# Patient Record
Sex: FEMALE | Race: ASIAN | NOT HISPANIC OR LATINO | Employment: UNEMPLOYED | URBAN - METROPOLITAN AREA
[De-identification: names, ages, dates, MRNs, and addresses within clinical notes are randomized per-mention and may not be internally consistent; named-entity substitution may affect disease eponyms.]

---

## 2017-01-30 ENCOUNTER — OFFICE VISIT - HEALTHEAST (OUTPATIENT)
Dept: UROLOGY | Facility: CLINIC | Age: 67
End: 2017-01-30

## 2017-01-30 DIAGNOSIS — N20.9 URINARY CALCULUS, UNSPECIFIED: ICD-10-CM

## 2017-01-30 DIAGNOSIS — N20.0 CALCULUS OF KIDNEY: ICD-10-CM

## 2021-06-02 ENCOUNTER — RECORDS - HEALTHEAST (OUTPATIENT)
Dept: ADMINISTRATIVE | Facility: CLINIC | Age: 71
End: 2021-06-02

## 2021-06-08 NOTE — PROGRESS NOTES
Assessment/Plan:        Diagnoses and all orders for this visit:    Calculus of kidney  -     Stone Formation, 24 Hour Urine x2; Standing  -     Renal Function Profile; Future; Expected date: 2/13/17  -     Uric Acid; Future; Expected date: 2/13/17  -     Magnesium; Future; Expected date: 2/13/17  -     24 Hour Urine Collection Steps Education    Urinary calculus, unspecified  -     POCT urinalysis dipstick-[facility]      Stone Management Plan  [facility] 11/17/2016 1/30/2017   Urinary Tract Infection Possible Infection No suspicion of infection   Renal Colic Asymptomatic at this time Asymptomatic at this time   Renal Failure No suspicion of renal failure No suspicion of renal failure   Current CT date 11/16/2016 -   Right sided stones? No -   R Stone Event No current event No current event   Left sided stones? Yes -   L Number of ureteral stones 1 -   L GSD of ureteral stones 3 -   L Location of ureteral stone Proximal -   L Number of kidney stones  No renal stones -   L GSD of kidney stones N/A -   L Hydronephrosis Moderate -   L Stone Event New event Resolved event   Diagnosis date 11/16/2016 -   Initial location of primary symptomatic stone Proximal -   Initial GSD of primary symptomatic stone 3 -   Resolved date - 1/30/2017   Post-op status - No imaging   L Current Plan Drain -   Drain rationale Infection -             Subjective:      HPI  Ms. Leonel Lerma is a 66 y.o. [place] female returning to the [facility] [place] for late postoperative follow-up.     She returns status post staged feft ureteroscopic extraction for renal stone, initially presented with concern of sepsis. She has had no unanticipated events.     She is asymptomatic at present. She denies symptoms of fever, chills, flank pain, nausea, vomiting, urinary frequency and dysuria.    Imaging was not performed today because stone was extracted intact and patient is asymptomatic.    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 Yalee states she has not followed up with her PCP.    Stone composition was 20 % calcium oxalate and 80 % uric acid.     PLAN    First time uric acid/calcium based stone former with interval clearance of left renal calculus, initially presented with sepsis.     She is at risk for ongoing active stone disease and will initiate stone risk evaluation.     Addendum: Patient initially agreed to proceed with stone risk workup. However, prior to exit, she states she would like to hold off on risk workup until she visits with her PCP.    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 So Edgar.       ROS   Review of systems is negative except for HPI.    Past Medical History   Diagnosis Date     Benign Essential Hypertension      Helicobacter Pylori (H. Pylori) Infection      Hepatitis      Hyperlipidemia      Joint Pain, Localized In The Shoulder      Lower Back Pain      Obesity        Past Surgical History   Procedure Laterality Date     Cystoscopy w/ ureteral stent placement Left 11/17/2016     Procedure: CYSTOSCOPY STENT INSERTION;  Surgeon: Saeid Reyez MD;  Location: [facility];  Service:      Cystoscopy w/ ureteral stent placement Left 12/2/2016     Procedure: CYSTOSCOPY, LEFT URETEROSCOPY STENT REMOVAL STONE REMOVAL;  Surgeon: Saeid Reyez MD;  Location: [facility];  Service:        Current Outpatient Prescriptions   Medication Sig Dispense Refill     amlodipine-olmesartan (LEONELA) 5-40 mg per tablet Take 1 tablet by mouth as needed. Pt takes it prn.       ibuprofen (ADVIL,MOTRIN) 200 MG tablet Take 400 mg by mouth every 6 (six) hours as needed for pain.       tamsulosin (FLOMAX) 0.4 mg Cp24 Take 0.4 mg by mouth daily.       tolterodine (DETROL) 2 MG tablet Take 1 tablet (2 mg total) by mouth every 12 (twelve) hours. 28  tablet 1     No current facility-administered medications for this visit.        Allergies   Allergen Reactions     Dramamine [Dimenhydrinate] Other (See Comments)     Tremors-questionable ,was in ER and was given dramamine  and started shaking but has taken OTC dramamine last 2 weeks without problems, possibly skaking due to elevated WBC in ER       Social History     Social History     Marital status:      Spouse name: N/A     Number of children: N/A     Years of education: N/A     Occupational History     Not on file.     Social History Main Topics     Smoking status: Never Smoker     Smokeless tobacco: Never Used     Alcohol use No     Drug use: No     Sexual activity: Not on file     Other Topics Concern     Not on file     Social History Narrative       Family History   Problem Relation Age of Onset     Kidney disease Brother      Hypertension Brother      Objective:      Physical Exam  There were no vitals filed for this visit.  General - well developed, well nourished, appropriate for age. Appears no distress at this time  Abdomen - mildly obese soft, non-tender, no hepatosplenomegaly, no masses.   - no flank tenderness, no suprapubic tenderness, kidney and bladder non-palpable  MSK - normal spinal curvature. no spinal tenderness. normal gait. muscular strength intact.  Psych - oriented to time, place, and person, normal mood and affect.      Labs   Urinalysis POC (Office):  BLOOD UA   Date Value Ref Range Status   11/17/2016 Large Negative Final     NITRITE, UA   Date Value Ref Range Status   11/17/2016 Negative Negative Final   11/16/2016 Positive (!) Negative Final   11/05/2014 Negative Negative Final   06/25/2012 Negative (Negative) Final     LEUKOCYTES, UA    Date Value Ref Range Status   11/17/2016 Large (!) Negative Final     PH UA   Date Value Ref Range Status   11/17/2016 6.0 4.5 - 8.0 Final       Lab Urinalysis:  BLOOD, UA   Date Value Ref Range Status   11/16/2016 Small (!) Negative Final    11/05/2014 Trace (!) Negative Final   06/25/2012 Trace-lysed (!) (Negative) Final     NITRITE, UA   Date Value Ref Range Status   11/17/2016 Negative Negative Final   11/16/2016 Positive (!) Negative Final   11/05/2014 Negative Negative Final   06/25/2012 Negative (Negative) Final     LEUKOCYTES, UA   Date Value Ref Range Status   11/16/2016 Large (!) Negative Final   11/05/2014 Small (!) Negative Final   06/25/2012 Negative (Negative) Final     PH, UA   Date Value Ref Range Status   11/16/2016 5.0 4.5 - 8.0 Final   11/05/2014 6.0 4.5 - 8.0 Final   06/25/2012 5.5 (5.0-8.0) Final

## 2022-06-13 ENCOUNTER — APPOINTMENT (OUTPATIENT)
Dept: CT IMAGING | Facility: CLINIC | Age: 72
End: 2022-06-13
Attending: EMERGENCY MEDICINE
Payer: MEDICARE

## 2022-06-13 ENCOUNTER — HOSPITAL ENCOUNTER (EMERGENCY)
Facility: CLINIC | Age: 72
Discharge: HOME | End: 2022-06-14
Attending: EMERGENCY MEDICINE | Admitting: EMERGENCY MEDICINE
Payer: MEDICARE

## 2022-06-13 DIAGNOSIS — N23 RENAL COLIC: ICD-10-CM

## 2022-06-13 LAB
ALBUMIN SERPL-MCNC: 3.3 G/DL (ref 3.5–5)
ALBUMIN UR-MCNC: NEGATIVE MG/DL
ALP SERPL-CCNC: 58 U/L (ref 45–120)
ALT SERPL W P-5'-P-CCNC: 29 U/L (ref 0–45)
ANION GAP SERPL CALCULATED.3IONS-SCNC: 10 MMOL/L (ref 5–18)
APPEARANCE UR: CLEAR
AST SERPL W P-5'-P-CCNC: 22 U/L (ref 0–40)
BILIRUB SERPL-MCNC: 0.4 MG/DL (ref 0–1)
BILIRUB UR QL STRIP: NEGATIVE
BUN SERPL-MCNC: 38 MG/DL (ref 8–28)
CALCIUM SERPL-MCNC: 9.4 MG/DL (ref 8.5–10.5)
CHLORIDE BLD-SCNC: 110 MMOL/L (ref 98–107)
CO2 SERPL-SCNC: 20 MMOL/L (ref 22–31)
COLOR UR AUTO: ABNORMAL
CREAT SERPL-MCNC: 1.63 MG/DL (ref 0.6–1.1)
ERYTHROCYTE [DISTWIDTH] IN BLOOD BY AUTOMATED COUNT: 13.4 % (ref 10–15)
GFR SERPL CREATININE-BSD FRML MDRD: 33 ML/MIN/1.73M2
GLUCOSE BLD-MCNC: 123 MG/DL (ref 70–125)
GLUCOSE UR STRIP-MCNC: NEGATIVE MG/DL
HCT VFR BLD AUTO: 37 % (ref 35–47)
HGB BLD-MCNC: 11.8 G/DL (ref 11.7–15.7)
HGB UR QL STRIP: ABNORMAL
KETONES UR STRIP-MCNC: NEGATIVE MG/DL
LEUKOCYTE ESTERASE UR QL STRIP: NEGATIVE
LIPASE SERPL-CCNC: 59 U/L (ref 0–52)
MCH RBC QN AUTO: 33.3 PG (ref 26.5–33)
MCHC RBC AUTO-ENTMCNC: 31.9 G/DL (ref 31.5–36.5)
MCV RBC AUTO: 105 FL (ref 78–100)
MUCOUS THREADS #/AREA URNS LPF: PRESENT /LPF
NITRATE UR QL: NEGATIVE
PH UR STRIP: 5.5 [PH] (ref 5–7)
PLATELET # BLD AUTO: 201 10E3/UL (ref 150–450)
POTASSIUM BLD-SCNC: 4.8 MMOL/L (ref 3.5–5)
PROT SERPL-MCNC: 7.4 G/DL (ref 6–8)
RBC # BLD AUTO: 3.54 10E6/UL (ref 3.8–5.2)
RBC URINE: 4 /HPF
SODIUM SERPL-SCNC: 140 MMOL/L (ref 136–145)
SP GR UR STRIP: 1.02 (ref 1–1.03)
SQUAMOUS EPITHELIAL: 2 /HPF
UROBILINOGEN UR STRIP-MCNC: <2 MG/DL
WBC # BLD AUTO: 9.9 10E3/UL (ref 4–11)
WBC URINE: 2 /HPF

## 2022-06-13 PROCEDURE — 83690 ASSAY OF LIPASE: CPT | Performed by: EMERGENCY MEDICINE

## 2022-06-13 PROCEDURE — 250N000011 HC RX IP 250 OP 636: Performed by: EMERGENCY MEDICINE

## 2022-06-13 PROCEDURE — 96374 THER/PROPH/DIAG INJ IV PUSH: CPT

## 2022-06-13 PROCEDURE — 99285 EMERGENCY DEPT VISIT HI MDM: CPT | Mod: 25

## 2022-06-13 PROCEDURE — 74176 CT ABD & PELVIS W/O CONTRAST: CPT

## 2022-06-13 PROCEDURE — 36415 COLL VENOUS BLD VENIPUNCTURE: CPT | Performed by: EMERGENCY MEDICINE

## 2022-06-13 PROCEDURE — 80053 COMPREHEN METABOLIC PANEL: CPT | Performed by: EMERGENCY MEDICINE

## 2022-06-13 PROCEDURE — 81001 URINALYSIS AUTO W/SCOPE: CPT | Performed by: EMERGENCY MEDICINE

## 2022-06-13 PROCEDURE — 96375 TX/PRO/DX INJ NEW DRUG ADDON: CPT

## 2022-06-13 PROCEDURE — 85027 COMPLETE CBC AUTOMATED: CPT | Performed by: EMERGENCY MEDICINE

## 2022-06-13 RX ORDER — KETOROLAC TROMETHAMINE 15 MG/ML
15 INJECTION, SOLUTION INTRAMUSCULAR; INTRAVENOUS ONCE
Status: COMPLETED | OUTPATIENT
Start: 2022-06-13 | End: 2022-06-13

## 2022-06-13 RX ADMIN — KETOROLAC TROMETHAMINE 15 MG: 15 INJECTION, SOLUTION INTRAMUSCULAR; INTRAVENOUS at 22:24

## 2022-06-13 RX ADMIN — FAMOTIDINE 20 MG: 10 INJECTION INTRAVENOUS at 22:20

## 2022-06-14 VITALS
TEMPERATURE: 97.1 F | DIASTOLIC BLOOD PRESSURE: 63 MMHG | RESPIRATION RATE: 18 BRPM | BODY MASS INDEX: 44.33 KG/M2 | SYSTOLIC BLOOD PRESSURE: 148 MMHG | HEART RATE: 61 BPM | OXYGEN SATURATION: 99 % | WEIGHT: 225.8 LBS | HEIGHT: 60 IN

## 2022-06-14 PROBLEM — N23 RENAL COLIC: Status: ACTIVE | Noted: 2022-06-14

## 2022-06-14 RX ORDER — ONDANSETRON 4 MG/1
4 TABLET, ORALLY DISINTEGRATING ORAL EVERY 8 HOURS PRN
Qty: 10 TABLET | Refills: 0 | Status: SHIPPED | OUTPATIENT
Start: 2022-06-14 | End: 2022-06-17

## 2022-06-14 RX ORDER — HYDROCODONE BITARTRATE AND ACETAMINOPHEN 5; 325 MG/1; MG/1
1 TABLET ORAL EVERY 6 HOURS PRN
Qty: 10 TABLET | Refills: 0 | Status: SHIPPED | OUTPATIENT
Start: 2022-06-14 | End: 2022-06-17

## 2022-06-14 NOTE — ED PROVIDER NOTES
EMERGENCY DEPARTMENT ENCOUNTER      NAME: Leonel Lerma  AGE: 72 year old female  YOB: 1950  MRN: [id number]  EVALUATION DATE & TIME: 6/13/2022  9:23 PM    PCP: No primary care provider on file.    ED PROVIDER: Efrain Kennedy M.D.      Chief Complaint   Patient presents with     Abdominal Pain     Flank Pain         FINAL IMPRESSION:  Right renal colic      ED COURSE & MEDICAL DECISION MAKING:    Pertinent Labs & Imaging studies reviewed. (See chart for details)  72 year old female presents to the Emergency Department for evaluation of abdominal pain.  She reports a vague achiness primarily in her right side of her abdomen.  Symptoms ongoing for much of the day.  Discomfort developed gradually and worsened slowly.  No real change with ambulation.  On exam she is obese female in mild distress.  No obvious CVA tenderness.  Minimal right lower quadrant/right upper quadrant tenderness.  No rebound or masses.  Patient does have a history of kidney stones however reports this feels differently.  Patient interviewed and examined with her daughter-in-law acting as .  We will proceed with laboratory evaluation and imaging.  Treated symptomatically with intravenous Pepcid and Toradol.    Patient appears non toxic with stable vitals signs. Overall exam is benign.        9:48 PM I met with the patient for the initial interview and physical examination. Discussed plan for treatment and workup in the ED.    10:47 PM.  CBC is normal other than mild macrocytosis.  Urine with trace hematuria but no evidence of infection.  Lipase and comprehensive metabolic panel pending.  11:27 PM.  Comprehensive metabolic panel unremarkable.  Slight acute kidney injury with BUN of 38 creatinine 1.63.  Given hematuria and absence of biliary disease on laboratory work we will proceed with CT imaging.  Patient informed of findings and plan.  This is agreeable.  Patient does feel improved after medications.  12:25 AM.  CT imaging  with evidence of right ureteral lithiasis.  This would explain symptoms.  Plan will be continued outpatient management with Zofran and pain medications.  Clear return instructions given.  At the conclusion of the encounter I discussed the results of all of the tests and the disposition. The questions were answered and return precautions provided. The patient or family acknowledged understanding and was agreeable with the care plan.       PPE: Provider wore gloves, N95 mask, eye protection, surgical cap.     MEDICATIONS GIVEN IN THE EMERGENCY:  Medications   famotidine (PEPCID) injection 20 mg (has no administration in time range)   ketorolac (TORADOL) injection 15 mg (has no administration in time range)       NEW PRESCRIPTIONS STARTED AT TODAY'S ER VISIT  Current Discharge Medication List      START taking these medications    Details   HYDROcodone-acetaminophen (NORCO) 5-325 MG tablet Take 1 tablet by mouth every 6 hours as needed for severe pain  Qty: 10 tablet, Refills: 0      ondansetron (ZOFRAN ODT) 4 MG ODT tab Take 1 tablet (4 mg) by mouth every 8 hours as needed for nausea  Qty: 10 tablet, Refills: 0                =================================================================    HPI    Patient information was obtained from: Patient    Use of Intrepreter: Yes - Daughter in Law - Jules Lerma is a 72 year old female with a pertient medical history of hyperlipidemia, and hypertension who presents to the ED for evaluation of abdominal pain.     At 3:00PM (~7 hours ago), right after eating, patient started having right sided abdominal pain. States the feeling of pressure and tightness in her abdomen. Notes the pain comes and goes. Endorses pain came while they were in the car driving here, but not when walking into the ED. Notes a history of kidney stones, but says this feels different. Patient denies any abdominal surgeries.     Patient denies nausea, vomiting, stool changes, diarrhea, or any other  complaints at this time.     REVIEW OF SYSTEMS   Constitutional:  Denies fever, chills  Respiratory:  Denies productive cough or increased work of breathing  Cardiovascular:  Denies chest pain, palpitations  GI:  Denies nausea, vomiting, diarrhea, or change in bowel or bladder habits. Positive for abdominal pain, tightness, and pressure.   Musculoskeletal:  Denies any new muscle/joint swelling  Skin:  Denies rash   Neurologic:  Denies focal weakness  All systems negative except as marked.     PAST MEDICAL HISTORY:  History reviewed. No pertinent past medical history.    PAST SURGICAL HISTORY:  Past Surgical History:   Procedure Laterality Date     COMBINED CYSTOSCOPY, INSERT STENT URETER(S) Left 11/17/2016    Procedure: CYSTOSCOPY STENT INSERTION;  Surgeon: Saeid Reyez MD;  Location: [facility];  Service:      COMBINED CYSTOSCOPY, INSERT STENT URETER(S) Left 12/2/2016    Procedure: CYSTOSCOPY, LEFT URETEROSCOPY STENT REMOVAL STONE REMOVAL;  Surgeon: Saeid Reyez MD;  Location: [facility];  Service:          CURRENT MEDICATIONS:      Current Facility-Administered Medications:      famotidine (PEPCID) injection 20 mg, 20 mg, Intravenous, Once, Efrain Kennedy MD     ketorolac (TORADOL) injection 15 mg, 15 mg, Intravenous, Once, Efrain Kennedy MD    Current Outpatient Medications:      amlodipine-olmesartan (LEONELA) 5-40 mg per tablet, [AMLODIPINE-OLMESARTAN (LEONELA) 5-40 MG PER TABLET] Take 1 tablet by mouth as needed. Pt takes it prn., Disp: , Rfl:     ALLERGIES:  Allergies   Allergen Reactions     Dramamine [Dimenhydrinate] Other (See Comments)     Tremors-questionable ,was in ER and was given dramamine  and started shaking but has taken OTC dramamine last 2 weeks without problems, possibly skaking due to elevated WBC in ER       FAMILY HISTORY:  Family History   Problem Relation Age of Onset     Kidney Disease Brother      Hypertension Brother        SOCIAL HISTORY:   Social History      Socioeconomic History     Marital status:      Spouse name: None     Number of children: None     Years of education: None     Highest education level: None   Tobacco Use     Smoking status: Never Smoker     Smokeless tobacco: Never Used   Substance and Sexual Activity     Alcohol use: No     Drug use: No       VITALS:  Patient Vitals for the past 24 hrs:   BP Temp Temp src Pulse Resp SpO2 Height Weight   06/13/22 2104 (!) 171/100 97.1  F (36.2  C) Temporal 76 20 96 % 1.524 m (5') 102.4 kg (225 lb 12.8 oz)        PHYSICAL EXAM    Constitutional:  Obese female, awake, alert, in mild distress  HENT:  Normocephalic, Atraumatic. Bilateral external ears normal. Oropharynx moist. Nose normal. Neck- Normal range of motion with no guarding, No midline cervical tenderness, Supple, No stridor.   Eyes:  PERRL, EOMI with no signs of entrapment, Conjunctiva normal, No discharge.   Respiratory:  Normal breath sounds, No respiratory distress, No wheezing.    Cardiovascular:  Normal heart rate, Normal rhythm, No appreciable rubs or gallops.   GI:  Soft, No distension, No palpable masses. Minimal right sided abdominal tenderness.   Musculoskeletal:  Intact distal pulses, No edema. Good range of motion in all major joints. No tenderness to palpation or major deformities noted.  Integument:  Warm, Dry, No erythema, No rash.   Neurologic:  Alert & oriented, Normal motor function, Normal sensory function, No focal deficits noted.   Psychiatric:  Affect normal, Judgment normal, Mood normal.     LAB:  All pertinent labs reviewed and interpreted.  Results for orders placed or performed during the hospital encounter of 06/13/22   CT Abdomen Pelvis w/o Contrast     Status: None (Preliminary result)    Narrative    EXAM: CT ABDOMEN PELVIS W/O CONTRAST  LOCATION: [facility]  DATE/TIME: 6/13/2022 11:42 PM    INDICATION: Flank pain, kidney stone suspected  COMPARISON: None.  TECHNIQUE: CT scan of the abdomen  and pelvis was performed without IV contrast. Multiplanar reformats were obtained. Dose reduction techniques were used.  CONTRAST: None.    FINDINGS:   LOWER CHEST: Mild subpleural scarring and/or atelectasis in the lung bases. Cardiac enlargement.    HEPATOBILIARY: Liver is negative. Cholelithiasis in a contracted gallbladder. No biliary dilatation.    PANCREAS: Normal.    SPLEEN: Normal.    ADRENAL GLANDS: Normal.    KIDNEYS/BLADDER: Motion artifact limits some detail at the level of the kidneys. 1 or 2 nonobstructing stones lower pole right kidney measuring up to 4 mm in diameter. Kidneys are otherwise negative. Mild right hydronephrosis is present and secondary to   an obstructing 2 mm stone in the proximal right ureter just below the UPJ. No left-sided ureteral stones or hydronephrosis. Bladder is grossly unremarkable.    BOWEL: Normal.    LYMPH NODES: Normal.    VASCULATURE: Aortic calcification without aneurysm.    PELVIC ORGANS: Normal.    MUSCULOSKELETAL: Moderate degenerative and hypertrophic changes lower thoracic and lumbar spine.      Impression    IMPRESSION:   1.  2 mm obstructing stone proximal right ureter resulting in mild right hydronephrosis.    2.  Additional nonobstructing stones lower pole right kidney.    3.  Cholelithiasis. No biliary dilatation.    4.  No acute bowel findings. Normal appendix.     UA with Microscopic reflex to Culture     Status: Abnormal    Specimen: Urine, Clean Catch   Result Value Ref Range    Color Urine Light Yellow Colorless, Straw, Light Yellow, Yellow    Appearance Urine Clear Clear    Glucose Urine Negative Negative mg/dL    Bilirubin Urine Negative Negative    Ketones Urine Negative Negative mg/dL    Specific Gravity Urine 1.018 1.001 - 1.030    Blood Urine 0.03 mg/dL (A) Negative    pH Urine 5.5 5.0 - 7.0    Protein Albumin Urine Negative Negative mg/dL    Urobilinogen Urine <2.0 <2.0 mg/dL    Nitrite Urine Negative Negative    Leukocyte Esterase Urine Negative  Negative    Mucus Urine Present (A) None Seen /LPF    RBC Urine 4 (H) <=2 /HPF    WBC Urine 2 <=5 /HPF    Squamous Epithelials Urine 2 (H) <=1 /HPF    Narrative    Urine Culture not indicated   [place] Draw *Canceled*     Status: None ()    Narrative    The following orders were created for panel order [place] Draw.  Procedure                               Abnormality         Status                     ---------                               -----------         ------                       Please view results for these tests on the individual orders.   Comprehensive metabolic panel     Status: Abnormal   Result Value Ref Range    Sodium 140 136 - 145 mmol/L    Potassium 4.8 3.5 - 5.0 mmol/L    Chloride 110 (H) 98 - 107 mmol/L    Carbon Dioxide (CO2) 20 (L) 22 - 31 mmol/L    Anion Gap 10 5 - 18 mmol/L    Urea Nitrogen 38 (H) 8 - 28 mg/dL    Creatinine 1.63 (H) 0.60 - 1.10 mg/dL    Calcium 9.4 8.5 - 10.5 mg/dL    Glucose 123 70 - 125 mg/dL    Alkaline Phosphatase 58 45 - 120 U/L    AST 22 0 - 40 U/L    ALT 29 0 - 45 U/L    Protein Total 7.4 6.0 - 8.0 g/dL    Albumin 3.3 (L) 3.5 - 5.0 g/dL    Bilirubin Total 0.4 0.0 - 1.0 mg/dL    GFR Estimate 33 (L) >60 mL/min/1.73m2   Lipase     Status: Abnormal   Result Value Ref Range    Lipase 59 (H) 0 - 52 U/L   CBC (+ platelets, no diff)     Status: Abnormal   Result Value Ref Range    WBC Count 9.9 4.0 - 11.0 10e3/uL    RBC Count 3.54 (L) 3.80 - 5.20 10e6/uL    Hemoglobin 11.8 11.7 - 15.7 g/dL    Hematocrit 37.0 35.0 - 47.0 %     (H) 78 - 100 fL    MCH 33.3 (H) 26.5 - 33.0 pg    MCHC 31.9 31.5 - 36.5 g/dL    RDW 13.4 10.0 - 15.0 %    Platelet Count 201 150 - 450 10e3/uL     RADIOLOGY:  Reviewed all pertinent imaging. Please see official radiology report.  No orders to display           Vika VAZ, am serving as a scribe to document services personally performed by Efrain Kennedy MD, based on my observation and the provider's statements to me. I, Efrain Kennedy MD  attest that Vika Anne is acting in a scribe capacity, has observed my performance of the services and has documented them in accordance with my direction.    Efrain Kennedy M.D.  Emergency Medicine  [facility] EMERGENCY ROOM     Efrain Kennedy MD  06/14/22 0026       Efrain Kennedy MD  06/14/22 0028

## 2022-06-14 NOTE — ED TRIAGE NOTES
Arrives to ED with c/o RLQ and R flank pain that began this afternoon. Denies N/V/D. Afebrile. Denies urinary sx. Hx of kidney stones.      Triage Assessment     Row Name 06/13/22 2107       Triage Assessment (Adult)    Airway WDL WDL       Respiratory WDL    Respiratory WDL WDL       Skin Circulation/Temperature WDL    Skin Circulation/Temperature WDL WDL       Cardiac WDL    Cardiac WDL X  HTN       Peripheral/Neurovascular WDL    Peripheral Neurovascular WDL WDL       Cognitive/Neuro/Behavioral WDL    Cognitive/Neuro/Behavioral WDL WDL

## 2025-04-30 ENCOUNTER — TRANSFERRED RECORDS (OUTPATIENT)
Dept: ADMINISTRATIVE | Facility: CLINIC | Age: 75
End: 2025-04-30
Payer: MEDICARE

## 2025-05-01 NOTE — PROCEDURES
[facility]   [address], [address], [place] [address]     Patient Name: Leonel Lerma  Gender:  Female  Exam Date: 04/30/2025 Visit Number:  [id number]  Age: 74 Years YOB: 1950  Attending MD: Junior Sanchez MD Medical Record#:  [id number]    Procedure: Colonoscopy   Indications: Colorectal cancer screening      Referring MD: Referral Self  Primary MD:      Kale Serna MD  Medications: Admitting Medications:   0.9% Normal Saline at TKO   Intra Procedure Medications:   Patient received monitored anesthesia care.     Complications: No immediate complications  ______________________________________________________________________________  Procedure:   An examination of the heart and lungs was performed and found to be within acceptable limits.  .  The patient was therefore deemed a reasonable candidate for endoscopy and sedation.   The risks and benefits of the procedure were explained to the patient.After obtaining informed consent, the patient received monitored anesthesia care and I passed the scope   without difficulty via the rectum to the ileum.  The appendiceal orifice and ic valve were identified.  The scope was retroflexed during the examination  The quality of the prep was good  (Miralax/Gatorade/2 tablets Bisacodyl/Magnesium Citrate).    This was a complete examination throughout the entire colon.    Findings:    Normal finding.  Location - ileum.    Polyp location: transverse colon.  Quantity: 1.  Size: 2 mm.  Polyp shape:  sessile.         Maneuver: polypectomy was performed with a cold biopsy forceps.       Removal:  complete.  Retrieval: complete.  Bleeding: none.    Diverticulosis.  Location: - sigmoid.        Quantity:  few.  No inflammation present.    Hemorrhoids.  Internal hemorrhoids without bleeding.  Remainder of the exam is normal.    Impression:  Screening Colonoscopy  Colorectal polyp detected on colonoscopy  Diverticulosis of colon without  diverticulitis    Preliminary Plan:  The patient and their physician will receive a copy of the pathology report as well as pathology-based recommendations for future screening or surveillance.  Pathology Results:  A: COLON, TRANSVERSE, POLYP:           1. Tubular adenoma           2. Negative for high grade dysplasia           3. Per the colonoscopy report:               a. Polyp size: 2 mm               b. Resection: Complete               c. Retrieval: Complete      MICROSCOPIC  A: Performed     Electronically signed by: Juan Coles MD    Interpreted at [facility], [address] [place] [id number]    Final Plan:  Repeat colonoscopy in 5 years for Polyp surveillance.    We will attempt to contact you at appropriate intervals via [place] mail.  We may not be able to find you or contact you at that time, therefore you should know that the responsibility for following our recommendation rests with you.  If you don't hear from us at the time your procedure is due, please contact our office to schedule an appointment.  If your contact information should change, please contact our office so that we can update your record.      _Electronically signed by:___________________  Junior Sanchez MD                 04/30/2025    cc: Kale Serna MD